# Patient Record
Sex: FEMALE | Race: WHITE | NOT HISPANIC OR LATINO | Employment: UNEMPLOYED | URBAN - METROPOLITAN AREA
[De-identification: names, ages, dates, MRNs, and addresses within clinical notes are randomized per-mention and may not be internally consistent; named-entity substitution may affect disease eponyms.]

---

## 2021-01-08 ENCOUNTER — TELEPHONE (OUTPATIENT)
Dept: INTERNAL MEDICINE | Facility: CLINIC | Age: 21
End: 2021-01-08

## 2021-01-08 NOTE — TELEPHONE ENCOUNTER
PATIENT IS REQUESTING FOR PCP, DR. MAZARIEGOS, TO CHECK SCHEDULE AND SEE IF ANY WAY POSSIBLE SHE CAN BE SQUEEZED IN FOR A PHYSICAL VISIT BEFORE GOING BACK TO SCHOOL IN [facility].    PATIENT WILL BE LEAVING FOR SCHOOL ON 01/18/21 AND IS REQUESTING IF ANY WAY POSSIBLE TO BE SQUEEZED IN ANY DAY PRIOR TO HER LEAVING.    PLEASE ADVISE    PATIENT IS REQUESTING A CALL BACK     CALL BACK NUMBER [phone]

## 2021-01-08 NOTE — TELEPHONE ENCOUNTER
Okay to work in for a physical on Friday, 1/15/2021 at 8:15 AM.  Please have her come to the appointment fasting.

## 2023-12-22 NOTE — TELEPHONE ENCOUNTER
Called patient and left message.     RELAY:    Patient needs to schedule a follow up appointment.  Please advise patient she needs to schedule and keep her appointment to continue to receive refills in the future.  If she is unable to keep her appointment we will not be able to provider further refills.  Once appointment has been scheduled please update message with date and time so we can process the request.  We will forward the message to the provider to review the refill request.

## 2023-12-22 NOTE — TELEPHONE ENCOUNTER
Please call patient. System states she is already taking [facility] OCP's. Please clarify if she needs this refill. Needs appointment scheduled with PCP as well.    Dr. Zabala

## 2023-12-23 DIAGNOSIS — Z30.011 ENCOUNTER FOR BCP (BIRTH CONTROL PILLS) INITIAL PRESCRIPTION: ICD-10-CM

## 2023-12-26 RX ORDER — NORGESTIMATE AND ETHINYL ESTRADIOL 7DAYSX3 LO
1 KIT ORAL DAILY
Qty: 28 TABLET | Refills: 2 | Status: SHIPPED | OUTPATIENT
Start: 2023-12-26 | End: 2024-12-25

## 2023-12-26 RX ORDER — NORGESTIMATE AND ETHINYL ESTRADIOL 7DAYSX3 28
1 KIT ORAL DAILY
Qty: 28 TABLET | Refills: 12 | OUTPATIENT
Start: 2023-12-26

## 2024-02-27 DIAGNOSIS — Z30.011 ENCOUNTER FOR BCP (BIRTH CONTROL PILLS) INITIAL PRESCRIPTION: ICD-10-CM

## 2024-02-27 DIAGNOSIS — Z30.41 ENCOUNTER FOR BIRTH CONTROL PILLS MAINTENANCE: Primary | ICD-10-CM

## 2024-02-27 RX ORDER — NORGESTIMATE AND ETHINYL ESTRADIOL 7DAYSX3 LO
1 KIT ORAL DAILY
Qty: 28 TABLET | Refills: 5 | Status: SHIPPED | OUTPATIENT
Start: 2024-02-27

## 2025-03-12 DIAGNOSIS — Z30.41 ENCOUNTER FOR BIRTH CONTROL PILLS MAINTENANCE: ICD-10-CM

## 2025-03-13 RX ORDER — NORGESTIMATE AND ETHINYL ESTRADIOL 7DAYSX3 LO
1 KIT ORAL DAILY
Qty: 28 TABLET | Refills: 5 | OUTPATIENT
Start: 2025-03-13